# Patient Record
Sex: MALE | Race: OTHER | HISPANIC OR LATINO | URBAN - METROPOLITAN AREA
[De-identification: names, ages, dates, MRNs, and addresses within clinical notes are randomized per-mention and may not be internally consistent; named-entity substitution may affect disease eponyms.]

---

## 2020-01-05 ENCOUNTER — EMERGENCY (EMERGENCY)
Facility: HOSPITAL | Age: 70
LOS: 1 days | Discharge: HOME | End: 2020-01-05
Admitting: EMERGENCY MEDICINE
Payer: SELF-PAY

## 2020-01-05 VITALS
RESPIRATION RATE: 18 BRPM | HEIGHT: 67 IN | OXYGEN SATURATION: 98 % | HEART RATE: 104 BPM | DIASTOLIC BLOOD PRESSURE: 80 MMHG | WEIGHT: 179.9 LBS | TEMPERATURE: 98 F | SYSTOLIC BLOOD PRESSURE: 154 MMHG

## 2020-01-05 DIAGNOSIS — Z79.899 OTHER LONG TERM (CURRENT) DRUG THERAPY: ICD-10-CM

## 2020-01-05 DIAGNOSIS — Z59.0 HOMELESSNESS: ICD-10-CM

## 2020-01-05 DIAGNOSIS — Z79.82 LONG TERM (CURRENT) USE OF ASPIRIN: ICD-10-CM

## 2020-01-05 DIAGNOSIS — M79.81 NONTRAUMATIC HEMATOMA OF SOFT TISSUE: ICD-10-CM

## 2020-01-05 DIAGNOSIS — I10 ESSENTIAL (PRIMARY) HYPERTENSION: ICD-10-CM

## 2020-01-05 PROCEDURE — 99283 EMERGENCY DEPT VISIT LOW MDM: CPT

## 2020-01-05 PROCEDURE — 99282 EMERGENCY DEPT VISIT SF MDM: CPT

## 2020-01-05 SDOH — ECONOMIC STABILITY - HOUSING INSECURITY: HOMELESSNESS: Z59.0

## 2020-01-05 NOTE — ED PROVIDER NOTE - CLINICAL SUMMARY MEDICAL DECISION MAKING FREE TEXT BOX
Patient is here for social issues / home assistance. SW was involved to help resolve issue and metro card was given to patient to travel back to prior residence or shelter in [place] where he want to go. . There is no other medical reason for his visit to ED.

## 2020-01-05 NOTE — ED PROVIDER NOTE - OBJECTIVE STATEMENT
70 y/o M with PMHx of HTN, undomiciled, here due to concerns that he is not able to go to his [facility] after being at a [place] [facility] for 2 weeks. States he has bumps on his head after falling 6 months ago, shows discharge paperwork from [facility] with negative head CT. Denies psych hx, drug use, or any other acute complaints. WIthout any physical complaints in ED. 68 y/o M with PMHx of HTN, undomiciled, here due to concerns that he is not able to go to his [facility] after being at a [place] shelter for 2 weeks. States he has bumps on his head after falling 6 months ago, shows discharge paperwork from [facility] with negative head CT. Denies psych hx, drug use, or any other acute complaints. Without any physical or medical complaints in ED.

## 2020-01-05 NOTE — ED PROVIDER NOTE - SKIN, MLM
Healing bruise on R frontal scalp. Skin normal color for race, warm, dry and intact. No evidence of rash.

## 2020-01-05 NOTE — ED ADULT NURSE NOTE - OBJECTIVE STATEMENT
pt received into spot D a&Ox3 ambulatory steady gait BIBA from outside [facility] where he flagged down [facility] bus asking for transport to [facility] as he had spent the night there after ETOH intox and fall with abrasion to right side of head CT report was negative pt has in his hand) Pt requesting a coat and metrocard upon arrival. awaiting md covarrubias in [place]

## 2020-01-05 NOTE — ED ADULT TRIAGE NOTE - CHIEF COMPLAINT QUOTE
" I feels cold , I don't want to go back to [place] ," " I feel cold , I don't want to go back to [place] ,"

## 2020-01-05 NOTE — ED PROVIDER NOTE - PATIENT PORTAL LINK FT
You can access the FollowMyHealth Patient Portal offered by [facility] by registering at the following website: [email or web address]. By joining [email or web address]’s FollowMyHealth portal, you will also be able to view your health information using other applications (apps) compatible with our system.

## 2020-01-05 NOTE — ED ADULT TRIAGE NOTE - OTHER COMPLAINTS
Pt was seen at the other hospital this am and was d/c home twice but [place] [facility] [place]

## 2020-01-05 NOTE — ED ADULT NURSE NOTE - OTHER COMPLAINTS
Pt was seen at the other hospital this am and was d/c home twice but he refused to go back in the shelter in [facility] .

## 2023-02-05 NOTE — ED PROVIDER NOTE - PRO INTERPRETER NEED 2
Immanuel Quach was seen and treated in our emergency department on 2/5/2023. He may return to work on 02/08/2023. If you have any questions or concerns, please don't hesitate to call.       Bharati Mar, DO English